# Patient Record
Sex: MALE | Race: WHITE | NOT HISPANIC OR LATINO | URBAN - METROPOLITAN AREA
[De-identification: names, ages, dates, MRNs, and addresses within clinical notes are randomized per-mention and may not be internally consistent; named-entity substitution may affect disease eponyms.]

---

## 2018-07-06 ENCOUNTER — OFFICE VISIT - HEALTHEAST (OUTPATIENT)
Dept: FAMILY MEDICINE | Facility: CLINIC | Age: 63
End: 2018-07-06

## 2018-07-06 DIAGNOSIS — S41.112A ARM LACERATION, LEFT, INITIAL ENCOUNTER: ICD-10-CM

## 2018-07-06 DIAGNOSIS — Z23 NEED FOR TETANUS BOOSTER: ICD-10-CM

## 2018-07-18 ENCOUNTER — OFFICE VISIT - HEALTHEAST (OUTPATIENT)
Dept: FAMILY MEDICINE | Facility: CLINIC | Age: 63
End: 2018-07-18

## 2018-07-18 DIAGNOSIS — T14.8XXA LOCAL INFECTION OF WOUND: ICD-10-CM

## 2018-07-18 DIAGNOSIS — L08.9 LOCAL INFECTION OF WOUND: ICD-10-CM

## 2018-07-18 DIAGNOSIS — Z48.02 ENCOUNTER FOR REMOVAL OF SUTURES: ICD-10-CM

## 2021-06-01 VITALS — WEIGHT: 168 LBS

## 2021-06-01 VITALS — WEIGHT: 165.9 LBS

## 2021-06-19 NOTE — PROGRESS NOTES
Assessment and Plan     Jaya was seen today for laceration.    Diagnoses and all orders for this visit:    Arm laceration, left, initial encounter    Need for tetanus booster  -     diphth,pertus(acell),tetanus injection 0.5 mL (BOOSTRIX); Inject 0.5 mL into the shoulder, thigh, or buttocks once.         HPI     Chief Complaint   Patient presents with     Laceration     laceration on left arm- pt slipped while standing on a fallen tree limb this morning        Jaya Howard is a 62 y.o. male seen today for a laceration on his left arm sustained approximately an hour prior to this evaluation.  He was standing on a downed tree limb approximately 1-1-1/2 feet off the ground he lost his balance and fell.  His left arm was holding the chainsaw so he moved that out away from his body as he fell and it struck the tree.  Denies any other pain or injury.  Denies neck or back pain.  Denies striking his head or losing consciousness.  He did have a syncopal episode at home following the injury.  He states he frequently responds this way to personal injury.  This was a witnessed episode that occurred while he was seated in the bathroom floor.  He was unresponsive for less than 30 seconds.     No current outpatient prescriptions on file.  No current facility-administered medications for this visit.      Reviewed and updated: medical history, medications and allergies.     Review of Systems     General: Denies fever, chills, fatigue.  MSK: Pain in the left forearm.  Denies any other pain or injury.     Objective     Vitals:    07/06/18 1106 07/06/18 1239   BP: (!) 84/56 99/66   Patient Site: Right Arm    Patient Position: Sitting    Cuff Size: Adult Regular    Pulse: 76    Resp: 16    Temp: 97.6  F (36.4  C)    TempSrc: Oral    Weight: 168 lb (76.2 kg)         Reviewed vital signs.  General: Appears calm, comfortable. Answers questions quickly and appropriately with clear speech. No apparent distress.  Skin: Pink, warm,  dry.  There are 2 lacerations on the anterior surface of the left forearm, one is approximately 2-1/2 cm and the other is approximately 2 cm.  Both lacerations have clean edges, are through the full-thickness of the skin and into the subcutaneous fat without injury to the underlying fascial tissue.  HENT: Normocephalic, atraumatic.  Neck: Supple.  Cardiovascular: Strong, regular radial pulse.  Respiratory: Normal respiratory effort.  Neuro: Memory and cognition appear normal. Normal gait.  Psych: Mood and affect appear normal.     Imaging:   No results found.    Labs:  No results found for this or any previous visit (from the past 24 hour(s)).     Medical Decision-Making     Jaya is a well-appearing 62-year-old male who presents with lacerations to his left forearm.  Lacerations are clean edges and do not appear to contain any detritus or foreign bodies.  He did experience a brief syncopal episode at home that appears to be psychogenic in nature.  Upon arrival to clinic and upon manipulation of his lacerations he did become somewhat lightheaded.  Moved him to a supine position and he returned to his baseline.  Upon completion of the repair his color returned to normal and he was ambulatory without lightheadedness or difficulty.  The 2 lacerations left forearm were successfully and well repaired as described below in the procedure note.  He was unable to recall when his last tetanus vaccination was and we were unable to find him in the [place] database so Tdap was administered.    Reviewed red flags that would trigger a prompt return to the clinic as noted below under patient instructions.  He expressed understanding of these directions and is in agreement with the plan.       Procedure     5 mL of 2% lidocaine with epinephrine was infused into the wound edges.  After verification of adequate anesthesia, the wound was cleansed with copious sterile saline under pressure and gently scrubbed.  In a well lit, bloodless  field was able to visualize the bases of the lacerations and verify that no foreign bodies or detritus are present.  The wound edges were cleansed in the usual fashion using povidone iodine swabs.  There were 2 lacerations that required repair: A 2 cm laceration of 2.5 cm laceration.  Both were successfully closed with simple interrupted sutures using 4-0 Ethilon.  Excellent wound approximation and eversion was achieved.  The procedure was well-tolerated by the patient.     Patient Instructions     Patient Instructions   1. Get sutures removed in 8 - 12 days  2. Keep water off of wound for 48 hours, after that you can gently wash and dry the area  3. No need for any antibiotic ointment after you removed the dressing in 48 hours, just keep area clean. If you do replace the   4. Call clinic if you see any signs of infection: fever, pus, bleeding that won't stop with 5 minutes of pressure, or increased pain not improved with either tylenol or ibuprofen  5. Call clinic with any other questions    What are stitches? -- Stitches are a way doctors can close certain types of cuts. A doctor uses a special needle and thread to put in stitches. He or she sews the edges of the cut together and ties knots to hold the stitches in place. The term doctors use for stitches is  sutures.   There are 2 main types of stitches:  ?Absorbable - These stitches dissolve over time. They do not need to be taken out.  ?Nonabsorbable - These stitches need to be taken out after a certain amount of time. They do not dissolve.  Minor cuts and scrapes that do not go all the way through the skin do not need stitches. If you get a cut and don t know if you need stitches, check with your doctor or nurse.  What happens when I get stitches? -- Before the doctor stitches  your cut, he or she will clean out the cut well. He or she will also give you numbing medicine so that you don t feel pain when the stitches go in.  After the doctor stitches your cut,  he or she will cover the area with gauze or a bandage.  Why is it important to take care of my stitches? -- It s important to take care of your stitches so that your cut heals well and doesn t get infected.   How do I take care of my stitches? -- Your doctor or nurse will give you specific instructions, depending on the type of stitches you have and where they are.   Here is some general advice you can follow:  ?Keep your stitches dry and covered with a bandage. Nonabsorbable stitches need to be kept dry for 1 to 2 days. Absorbable stitches need to be kept dry longer. Your doctor or nurse will tell you exactly how long to keep your stitches dry.  ?Once you no longer need to keep your stitches dry, gently wash them with soap and water whenever you take a shower. Do not put your stitches underwater, such as in a bath, pool, or lake. Getting your stitches too wet can slow down healing and raise your chance of getting an infection.  ?After you wash your stitches, pat them dry and put a dab of vaseline on them.  ?Cover your stitches with a bandage or gauze, unless your doctor or nurse tells you not to.  ?Avoid any activities or sports that could hurt the area of your stitches for 1 to 2 weeks. (Your doctor or nurse will tell you exactly how long to avoid these activities.) If you hurt the same part of your body again, your stitches can break, and the cut can open up again.   When should I call the doctor or nurse? -- Call your doctor or nurse if:  ?Your stitches break or the cut opens up again.  ?You get a fever.  ?You have redness or swelling around the cut, or pus drains from the cut. It is normal for clear yellow fluid to drain from the cut in the first few days.  When will my stitches or staples be taken out? -- The doctor who puts in the stitches or staples will tell you when to see your doctor or nurse to have them taken out. Nonabsorbable stitches usually stay in for 5 to 14 days, depending on where they  are.  What should I do after my stitches are out? -- After your stitches or staples are out, you should protect the scar from the sun. Use sunscreen on the area or wear clothes or a hat that covers the scar.   Your doctor or nurse might also recommend that you use certain lotions or creams to help your scar heal.    Suture removal -- The timing of suture removal varies with the anatomic site:  ?Eyelids - Three days  ?Neck - Three to four days  ?Face - Five days  ?Scalp - 7 to 14 days  ?Trunk and upper extremities - Seven days  ?Lower extremities - 8 to 10 days        Discussed benefit vs risk of medications, dosing, side effects.  Patient was able to verbalize understanding.  After visit summary was provided for patient.     Asher Manriquez PA-C

## 2021-06-26 NOTE — PROGRESS NOTES
Progress Notes by Beata Clark PA-C at 7/18/2018  8:40 AM     Author: Beata Clark PA-C Service: -- Author Type: Physician Assistant    Filed: 7/18/2018  9:22 AM Encounter Date: 7/18/2018 Status: Signed    : Beata Clark PA-C (Physician Assistant)       Assessment:       Wound infection. - concern for infection given erythema and yellow/crusted discharge after 12 days of healing. Unable to obtain wound culture at this time due to no active drainage of the wound.  Suture removal.      Plan:       10 sutures were removed.  Antibiotics per orders.  Probiotics discussed.  Wound care discussed.  Discussed signs of worsening symptoms and when to follow-up with PCP if no symptom improvement.    Patient Instructions   You were seen today for a skin infection known as cellulitis.    Symptom management:  - Take antibiotics as prescribed for the full course, even if symptoms improve  - Keep the affected area clean with gentle water and soap  - If possible, keep the area elevated above the chest to help with swelling  - May use tylenol or ibuprofen for pain or discomfort as needed    Reasons to return immediately for re-evaluation:  - Difficulty or pain with moving joints around the affected area  - Develop discharge or pus-like drainage  - Fever of 100.4 or higher  - Worsening pain/swelling or spreading redness outside the marked area after 24 hours of antibiotics  - Vomiting    Otherwise, follow-up as directed or as needed with your primary care provider              Subjective:       Jaya Howard is a 62 y.o. male who obtained two lacerations 12 days ago, which required closure with 10 sutures. Mechanism of injury: fell while climbing a downed tree and scrapped hand on the bark. He denies pain, fever, nausea, and vomiting. There is however some mild purulent drainage and redness surrounding the wounds. His tetanus was updated at the initial visit.    The following portions of the patient's  history were reviewed and updated as appropriate: allergies, current medications and problem list.    Review of Systems  Pertinent items are noted in HPI.    Allergies  Allergies   Allergen Reactions   ? Ampicillin Anaphylaxis and Swelling         Objective:       /72 (Patient Site: Right Arm, Patient Position: Sitting, Cuff Size: Adult Regular)  Pulse 78  Temp 97.8  F (36.6  C) (Oral)   Resp 20  Wt 165 lb 14.4 oz (75.3 kg)  SpO2 98%  Injury exam:  Two 2 cm lacerations noted on the left forearm are closed well with 5 sutures placed for each laceration. Surrounding erythema with yellow/crusted discharge at the wound margins and suture sites.